# Patient Record
Sex: FEMALE | Race: WHITE | NOT HISPANIC OR LATINO | Employment: FULL TIME | URBAN - METROPOLITAN AREA
[De-identification: names, ages, dates, MRNs, and addresses within clinical notes are randomized per-mention and may not be internally consistent; named-entity substitution may affect disease eponyms.]

---

## 2017-06-24 ASSESSMENT — MIFFLIN-ST. JEOR: SCORE: 1432.76

## 2017-06-25 ENCOUNTER — SURGERY - HEALTHEAST (OUTPATIENT)
Dept: SURGERY | Facility: HOSPITAL | Age: 52
End: 2017-06-25

## 2017-06-25 ENCOUNTER — ANESTHESIA - HEALTHEAST (OUTPATIENT)
Dept: SURGERY | Facility: HOSPITAL | Age: 52
End: 2017-06-25

## 2017-06-25 ASSESSMENT — MIFFLIN-ST. JEOR
SCORE: 1433.67
SCORE: 1432.76

## 2017-06-26 ENCOUNTER — COMMUNICATION - HEALTHEAST (OUTPATIENT)
Dept: SURGERY | Facility: CLINIC | Age: 52
End: 2017-06-26

## 2017-08-28 ASSESSMENT — MIFFLIN-ST. JEOR
SCORE: 1410.08
SCORE: 1283.08

## 2017-08-29 ASSESSMENT — MIFFLIN-ST. JEOR: SCORE: 1406.91

## 2017-08-30 ENCOUNTER — ANESTHESIA - HEALTHEAST (OUTPATIENT)
Dept: SURGERY | Facility: HOSPITAL | Age: 52
End: 2017-08-30

## 2017-08-30 ENCOUNTER — SURGERY - HEALTHEAST (OUTPATIENT)
Dept: SURGERY | Facility: HOSPITAL | Age: 52
End: 2017-08-30

## 2017-09-20 ENCOUNTER — HOSPITAL ENCOUNTER (OUTPATIENT)
Dept: MAMMOGRAPHY | Facility: HOSPITAL | Age: 52
Discharge: HOME | End: 2017-09-20

## 2017-09-20 DIAGNOSIS — Z12.31 VISIT FOR SCREENING MAMMOGRAM: ICD-10-CM

## 2019-05-22 ENCOUNTER — HOSPITAL ENCOUNTER (OUTPATIENT)
Dept: MAMMOGRAPHY | Facility: CLINIC | Age: 54
Discharge: HOME | End: 2019-05-22

## 2019-05-22 DIAGNOSIS — Z12.31 VISIT FOR SCREENING MAMMOGRAM: ICD-10-CM

## 2020-07-15 ENCOUNTER — HOSPITAL ENCOUNTER (OUTPATIENT)
Dept: MAMMOGRAPHY | Facility: CLINIC | Age: 55
Discharge: HOME | End: 2020-07-15

## 2020-07-15 DIAGNOSIS — Z12.31 VISIT FOR SCREENING MAMMOGRAM: ICD-10-CM

## 2021-05-25 ENCOUNTER — RECORDS - HEALTHEAST (OUTPATIENT)
Dept: ADMINISTRATIVE | Facility: CLINIC | Age: 56
End: 2021-05-25

## 2021-05-26 ENCOUNTER — RECORDS - HEALTHEAST (OUTPATIENT)
Dept: ADMINISTRATIVE | Facility: CLINIC | Age: 56
End: 2021-05-26

## 2021-05-30 ENCOUNTER — RECORDS - HEALTHEAST (OUTPATIENT)
Dept: ADMINISTRATIVE | Facility: CLINIC | Age: 56
End: 2021-05-30

## 2021-05-31 VITALS — WEIGHT: 188.2 LBS | BODY MASS INDEX: 32.13 KG/M2 | HEIGHT: 64 IN

## 2021-05-31 VITALS — WEIGHT: 182.3 LBS | BODY MASS INDEX: 31.12 KG/M2 | HEIGHT: 64 IN

## 2021-06-02 ENCOUNTER — RECORDS - HEALTHEAST (OUTPATIENT)
Dept: ADMINISTRATIVE | Facility: CLINIC | Age: 56
End: 2021-06-02

## 2021-06-11 NOTE — ANESTHESIA PREPROCEDURE EVALUATION
Anesthesia Evaluation      Patient summary reviewed   History of anesthetic complications (There is a family history of pseudocholinesterase deficiency *NO SUCCINYLCHOLINE*)     Airway   Mallampati: II  Neck ROM: full   Pulmonary - normal exam                          Cardiovascular   Exercise tolerance: > or = 4 METS  ECG reviewed (6/17: Sinus megan)  Rhythm: regular  Rate: normal,         Neuro/Psych      Endo/Other - negative ROS   (+) obesity (BMI 32),      GI/Hepatic/Renal - negative ROS      Other findings: Sleepy from pain med administration      Dental      Comment: She does have a broken tooth on the right upper jaw, discussed the risk of dental trauma                       Anesthesia Plan  Planned anesthetic: general endotracheal  Rocuronium only please    ASA 2     Anesthetic plan and risks discussed with: patient  Anesthesia plan special considerations: antiemetics,   Post-op plan: routine recovery

## 2021-06-11 NOTE — ANESTHESIA CARE TRANSFER NOTE
Last vitals:   Vitals:    06/25/17 1617   BP: 109/62   Pulse: 80   Resp: 14   Temp: 37.4  C (99.4  F)   SpO2: 100%     Patient's level of consciousness is drowsy  Spontaneous respirations: yes  Maintains airway independently: yes  Dentition unchanged: yes  Oropharynx: oropharynx clear of all foreign objects    QCDR Measures:  ASA# 20 - Surgical Safety Checklist: ASA20A - Safety Checks Done  PQRS# 430 - Adult PONV Prevention: 4558F - Pt received => 2 anti-emetic agents (different classes) preop & intraop  ASA# 8 - Peds PONV Prevention: NA - Not pediatric patient, not GA or 2 or more risk factors NOT present  PQRS# 424 - Teresa-op Temp Management: 4559F - At least one body temp DOCUMENTED => 35.5C or 95.9F within required timeframe  PQRS# 426 - PACU Transfer Protocol: - Transfer of care checklist used  ASA# 14 - Acute Post-op Pain: ASA14B - Patient did NOT experience pain >= 7 out of 10

## 2021-06-12 NOTE — ANESTHESIA PREPROCEDURE EVALUATION
"Anesthesia Evaluation      Patient summary reviewed   History of anesthetic complications ( Pseudocholinesterase deificiency in Mom, Sister's x 2 and Aunt)     Airway   Mallampati: II  Neck ROM: full   Pulmonary - normal exam   (+) a smoker (Uses E-cigs)                         Cardiovascular - negative ROS and normal exam   Neuro/Psych - negative ROS     Endo/Other    (+) obesity,      Comments: Left adrenal mass    GI/Hepatic/Renal - negative ROS           Dental    (+) chipped and caps    Comment: Broken tooth                       Anesthesia Plan  Planned anesthetic: general endotracheal  Decadron 10 mg IV  Toradol if \"ok\" with Dr. Pittman  Scopolamine patch  NO Sux  ASA 2   Induction: intravenous   Anesthetic plan and risks discussed with: patient  Anesthesia plan special considerations: antiemetics,   Post-op plan: routine recovery          "

## 2021-06-12 NOTE — ANESTHESIA POSTPROCEDURE EVALUATION
Patient: Kierra Alvarez  CHOLECYSTECTOMY, LAPAROSCOPIC LYSIS OF ADHESIONS, WITH CHOLANGIOGRAMS  Anesthesia type: general    Patient location: PACU  Last vitals:   Vitals:    08/30/17 1410   BP: 123/69   Pulse: (!) 55   Resp: 17   Temp:    SpO2: 100%     Post vital signs: stable  Level of consciousness: awake and responds to simple questions  Post-anesthesia pain: pain controlled  Post-anesthesia nausea and vomiting: no  Pulmonary: unassisted  Cardiovascular: stable  Hydration: adequate  Anesthetic events: no    QCDR Measures:  ASA# 11 - Teresa-op Cardiac Arrest: ASA11B - Patient did NOT experience unanticipated cardiac arrest  ASA# 12 - Teresa-op Mortality Rate: ASA12B - Patient did NOT die  ASA# 13 - PACU Re-Intubation Rate: ASA13B - Patient did NOT require a new airway mgmt  ASA# 10 - Composite Anes Safety: ASA10A - No serious adverse event  ASA# 38 - New Corneal Injury: ASA38A - No new exposure keratitis or corneal abrasion in PACU    Additional Notes:

## 2021-06-12 NOTE — ANESTHESIA CARE TRANSFER NOTE
Last vitals:   Vitals:    08/30/17 1403   BP: 127/72   Pulse: 64   Resp: 12   Temp: 36.2  C (97.2  F)   SpO2: 100%     Patient's level of consciousness is drowsy  Spontaneous respirations: yes  Maintains airway independently: yes  Dentition unchanged: yes  Oropharynx: oropharynx clear of all foreign objects    QCDR Measures:  ASA# 20 - Surgical Safety Checklist: WHO surgical safety checklist completed prior to induction  PQRS# 430 - Adult PONV Prevention: 4558F - Pt received => 2 anti-emetic agents (different classes) preop & intraop  ASA# 8 - Peds PONV Prevention: NA - Not pediatric patient, not GA or 2 or more risk factors NOT present  PQRS# 424 - Teresa-op Temp Management: 4559F - At least one body temp DOCUMENTED => 35.5C or 95.9F within required timeframe  PQRS# 426 - PACU Transfer Protocol: - Transfer of care checklist used  ASA# 14 - Acute Post-op Pain: ASA14B - Patient did NOT experience pain >= 7 out of 10

## 2021-06-16 PROBLEM — E80.6 HYPERBILIRUBINEMIA: Status: ACTIVE | Noted: 2017-08-28

## 2021-06-16 PROBLEM — R79.89 ELEVATED LFTS: Status: ACTIVE | Noted: 2017-08-28

## 2021-06-16 PROBLEM — K37 APPENDICITIS: Status: ACTIVE | Noted: 2017-06-25

## 2021-06-16 PROBLEM — K80.10 CHOLECYSTITIS WITH CHOLELITHIASIS: Status: ACTIVE | Noted: 2017-08-28

## 2021-06-26 ENCOUNTER — HEALTH MAINTENANCE LETTER (OUTPATIENT)
Age: 56
End: 2021-06-26

## 2021-07-13 ENCOUNTER — RECORDS - HEALTHEAST (OUTPATIENT)
Dept: ADMINISTRATIVE | Facility: CLINIC | Age: 56
End: 2021-07-13

## 2021-10-16 ENCOUNTER — HEALTH MAINTENANCE LETTER (OUTPATIENT)
Age: 56
End: 2021-10-16

## 2022-03-03 ENCOUNTER — LAB REQUISITION (OUTPATIENT)
Dept: LAB | Facility: CLINIC | Age: 57
End: 2022-03-03

## 2022-03-03 DIAGNOSIS — R79.89 OTHER SPECIFIED ABNORMAL FINDINGS OF BLOOD CHEMISTRY: ICD-10-CM

## 2022-03-03 DIAGNOSIS — Z13.220 ENCOUNTER FOR SCREENING FOR LIPOID DISORDERS: ICD-10-CM

## 2022-03-03 LAB
ALBUMIN SERPL-MCNC: 4.2 G/DL (ref 3.5–5)
ALP SERPL-CCNC: 103 U/L (ref 45–120)
ALT SERPL W P-5'-P-CCNC: 65 U/L (ref 0–45)
ANION GAP SERPL CALCULATED.3IONS-SCNC: 13 MMOL/L (ref 5–18)
AST SERPL W P-5'-P-CCNC: 35 U/L (ref 0–40)
BILIRUB SERPL-MCNC: 0.8 MG/DL (ref 0–1)
BUN SERPL-MCNC: 9 MG/DL (ref 8–22)
CALCIUM SERPL-MCNC: 9.7 MG/DL (ref 8.5–10.5)
CHLORIDE BLD-SCNC: 104 MMOL/L (ref 98–107)
CHOLEST SERPL-MCNC: 194 MG/DL
CO2 SERPL-SCNC: 26 MMOL/L (ref 22–31)
CREAT SERPL-MCNC: 0.79 MG/DL (ref 0.6–1.1)
FASTING STATUS PATIENT QL REPORTED: ABNORMAL
GFR SERPL CREATININE-BSD FRML MDRD: 88 ML/MIN/1.73M2
GLUCOSE BLD-MCNC: 88 MG/DL (ref 70–125)
HDLC SERPL-MCNC: 47 MG/DL
LDLC SERPL CALC-MCNC: 121 MG/DL
POTASSIUM BLD-SCNC: 3.8 MMOL/L (ref 3.5–5)
PROT SERPL-MCNC: 7.4 G/DL (ref 6–8)
SODIUM SERPL-SCNC: 143 MMOL/L (ref 136–145)
TRIGL SERPL-MCNC: 132 MG/DL

## 2022-03-03 PROCEDURE — 82040 ASSAY OF SERUM ALBUMIN: CPT | Performed by: STUDENT IN AN ORGANIZED HEALTH CARE EDUCATION/TRAINING PROGRAM

## 2022-03-03 PROCEDURE — 80053 COMPREHEN METABOLIC PANEL: CPT | Performed by: STUDENT IN AN ORGANIZED HEALTH CARE EDUCATION/TRAINING PROGRAM

## 2022-03-03 PROCEDURE — 80061 LIPID PANEL: CPT | Performed by: STUDENT IN AN ORGANIZED HEALTH CARE EDUCATION/TRAINING PROGRAM

## 2022-07-23 ENCOUNTER — HEALTH MAINTENANCE LETTER (OUTPATIENT)
Age: 57
End: 2022-07-23

## 2022-10-01 ENCOUNTER — HEALTH MAINTENANCE LETTER (OUTPATIENT)
Age: 57
End: 2022-10-01

## 2023-08-06 ENCOUNTER — HEALTH MAINTENANCE LETTER (OUTPATIENT)
Age: 58
End: 2023-08-06

## 2023-11-02 ENCOUNTER — LAB REQUISITION (OUTPATIENT)
Dept: LAB | Facility: CLINIC | Age: 58
End: 2023-11-02

## 2023-11-02 ENCOUNTER — TRANSFERRED RECORDS (OUTPATIENT)
Dept: HEALTH INFORMATION MANAGEMENT | Facility: CLINIC | Age: 58
End: 2023-11-02
Payer: COMMERCIAL

## 2023-11-02 DIAGNOSIS — Z13.220 ENCOUNTER FOR SCREENING FOR LIPOID DISORDERS: ICD-10-CM

## 2023-11-02 DIAGNOSIS — Z13.1 ENCOUNTER FOR SCREENING FOR DIABETES MELLITUS: ICD-10-CM

## 2023-11-02 PROCEDURE — 80053 COMPREHEN METABOLIC PANEL: CPT | Performed by: NURSE PRACTITIONER

## 2023-11-02 PROCEDURE — 80061 LIPID PANEL: CPT | Performed by: NURSE PRACTITIONER

## 2023-11-03 LAB
ALBUMIN SERPL BCG-MCNC: 4.5 G/DL (ref 3.5–5.2)
ALP SERPL-CCNC: 102 U/L (ref 35–104)
ALT SERPL W P-5'-P-CCNC: 96 U/L (ref 0–50)
ANION GAP SERPL CALCULATED.3IONS-SCNC: 13 MMOL/L (ref 7–15)
AST SERPL W P-5'-P-CCNC: 50 U/L (ref 0–45)
BILIRUB SERPL-MCNC: 0.5 MG/DL
BUN SERPL-MCNC: 9.2 MG/DL (ref 6–20)
CALCIUM SERPL-MCNC: 9.4 MG/DL (ref 8.6–10)
CHLORIDE SERPL-SCNC: 103 MMOL/L (ref 98–107)
CHOLEST SERPL-MCNC: 198 MG/DL
CREAT SERPL-MCNC: 0.84 MG/DL (ref 0.51–0.95)
DEPRECATED HCO3 PLAS-SCNC: 25 MMOL/L (ref 22–29)
EGFRCR SERPLBLD CKD-EPI 2021: 81 ML/MIN/1.73M2
GLUCOSE SERPL-MCNC: 92 MG/DL (ref 70–99)
HDLC SERPL-MCNC: 48 MG/DL
LDLC SERPL CALC-MCNC: 127 MG/DL
NONHDLC SERPL-MCNC: 150 MG/DL
POTASSIUM SERPL-SCNC: 3.8 MMOL/L (ref 3.4–5.3)
PROT SERPL-MCNC: 7.6 G/DL (ref 6.4–8.3)
SODIUM SERPL-SCNC: 141 MMOL/L (ref 135–145)
TRIGL SERPL-MCNC: 116 MG/DL

## 2023-11-16 ENCOUNTER — TRANSCRIBE ORDERS (OUTPATIENT)
Dept: OTHER | Age: 58
End: 2023-11-16

## 2023-11-16 ENCOUNTER — MEDICAL CORRESPONDENCE (OUTPATIENT)
Dept: HEALTH INFORMATION MANAGEMENT | Facility: CLINIC | Age: 58
End: 2023-11-16
Payer: COMMERCIAL

## 2023-11-16 ENCOUNTER — PATIENT OUTREACH (OUTPATIENT)
Dept: ONCOLOGY | Facility: CLINIC | Age: 58
End: 2023-11-16
Payer: COMMERCIAL

## 2023-11-16 DIAGNOSIS — Z80.0 FAMILY HISTORY OF LYNCH SYNDROME: Primary | ICD-10-CM

## 2023-11-16 NOTE — PROGRESS NOTES
Writer received referral to Cancer Risk Management/Genetic Counseling.    Referred for:     family Hx of Rodríguez Syndrome        Referral reviewed for appropriate plan, and sent to New Patient Scheduling for completion.    Annelise Mas, RN, BSN  Oncology New Patient Nurse Navigator   [facility]  [phone]

## 2024-01-04 ENCOUNTER — TRANSFERRED RECORDS (OUTPATIENT)
Dept: HEALTH INFORMATION MANAGEMENT | Facility: CLINIC | Age: 59
End: 2024-01-04
Payer: COMMERCIAL

## 2024-01-04 ENCOUNTER — MEDICAL CORRESPONDENCE (OUTPATIENT)
Dept: HEALTH INFORMATION MANAGEMENT | Facility: CLINIC | Age: 59
End: 2024-01-04
Payer: COMMERCIAL

## 2024-01-04 NOTE — TELEPHONE ENCOUNTER
Action January 4, 2024 3:31 PM ABT   Action Taken Records from Ivan received and sent to HIM for upload     RECORDS STATUS - ALL OTHER DIAGNOSIS      [facility]   DATE RECEIVED:    NOTES STATUS DETAILS   OFFICE NOTE from referring provider External: Ivan 11/02/23: [facility]   DISCHARGE SUMMARY from hospital [facility] 08/28/17, 06/24/17: [facility]   DISCHARGE REPORT from the ER [facility] 06/19/22: [facility]   OPERATIVE REPORT [facility] 06/25/17: Appendectomy, laparoscopic   MEDICATION LIST External: Ivan    LABS     PATHOLOGY REPORTS Reports in EPIC 08/30/17: [id number]  06/25/17: [id number]   ANYTHING RELATED TO DIAGNOSIS External: Ivan Most recent 11/02/23   IMAGING (NEED IMAGES & REPORT)     CT SCANS PACS 06/25/17, 06/03/16: CT AP   ULTRASOUND PACS 08/28/17: US Abd

## 2024-04-04 ENCOUNTER — VIRTUAL VISIT (OUTPATIENT)
Dept: ONCOLOGY | Facility: CLINIC | Age: 59
End: 2024-04-04
Attending: NURSE PRACTITIONER
Payer: COMMERCIAL

## 2024-04-04 ENCOUNTER — PRE VISIT (OUTPATIENT)
Dept: ONCOLOGY | Facility: CLINIC | Age: 59
End: 2024-04-04
Payer: COMMERCIAL

## 2024-04-04 DIAGNOSIS — Z83.719 FAMILY HISTORY OF COLONIC POLYPS: ICD-10-CM

## 2024-04-04 DIAGNOSIS — Z80.0 FAMILY HISTORY OF COLON CANCER: ICD-10-CM

## 2024-04-04 DIAGNOSIS — Z80.49 FAMILY HISTORY OF MALIGNANT NEOPLASM OF UTERUS: ICD-10-CM

## 2024-04-04 DIAGNOSIS — Z84.81 FAMILY HISTORY OF CARRIER OF GENETIC DISEASE: ICD-10-CM

## 2024-04-04 DIAGNOSIS — Z80.3 FAMILY HISTORY OF MALIGNANT NEOPLASM OF BREAST: ICD-10-CM

## 2024-04-04 DIAGNOSIS — Z80.0 FAMILY HISTORY OF LYNCH SYNDROME: Primary | ICD-10-CM

## 2024-04-04 DIAGNOSIS — Z80.0 FAMILY HISTORY OF STOMACH CANCER: ICD-10-CM

## 2024-04-04 DIAGNOSIS — Z80.42 FAMILY HISTORY OF MALIGNANT NEOPLASM OF PROSTATE: ICD-10-CM

## 2024-04-04 PROCEDURE — 96040 HC GENETIC COUNSELING, EACH 30 MINUTES: CPT | Mod: GT,95 | Performed by: GENETIC COUNSELOR, MS

## 2024-04-04 NOTE — LETTER
4/4/2024         RE: Kierra Alvarez  [address] [place] [address]        Dear Colleague,    Thank you for referring your patient, Kierra Alvarez, to the [facility]. Please see a copy of my visit note below.    Virtual Visit Details    Type of service:  Video Visit   Video Start Time: 11:04am  Video End Time: 12:00pm  Originating Location (pt. Location): Home  Distant Location (provider location):  Off-site  Platform used for Video Visit: Yoostay    4/4/2024    Referring Provider:    Celso Moon MD     Presenting Information:   I met with Kierra Alvarez today via video visit for genetic counseling to discuss the known MSH6 mutation in her family. Specifically, the familial MSH6 mutation is called [id number]. She is here today to review this history, cancer screening recommendations, and available genetic testing options.    Personal History:  Kierra is a 59 year old female. She does not have any personal history of cancer.      She had her first menstrual period at age 13, her first child at age 25, and reports that she is post-menopausal.  Kierra is s/p hysterectomy in 2002 for prolapse; ovarian remain in place. She reports past history of oral contraceptive use for about 18 years and that she has never been on hormone replacement therapy.      Her most recent mammogram in July 2020 was normal and her breast density was categorized as scattered fibroglandular densities. Her first and most recent colonoscopy in 2017 was normal and follow-up was recommended in 10 years.  She does not regularly do any other cancer screening at this time.  Kierra reported past tobacco use (quit in 2012), and 0-2 drinks per week for alcohol use.    Family History: (Please see scanned pedigree for detailed family history information)  Son, age 33, has a history of 2-5 colon polyps.   Daughter, age 24, has a history of 2-5 colon polyps.   Three of her brother and one sister have had a handful of  colon polyps (all less then 10)   Kierra reports that all 7 of her maternal uncles have had prostate cancers.   One of these maternal uncles also had a colon cancer diagnosed at age 48.   One of these maternal uncles also had a colon cancer diagnosed at age 52.   One of these maternal uncles underwent genetic testing and tested positive for the familial MSH6 mutation. This uncle has a daughter, age 48, who was diagnosed with a colon cancer at age 47 and hs a history of many (100+) colon polyps. Kierra reports that this cousin has also tested positive for the familial MSH6 mutation.   Maternal aunt had a history of breast cancer diagnosed at age 65. This aunt has a daughter with a history of endometrial cancer diagnosed in her 50's. She has tested positive for the familial MSH6 mutation (Kierra did provide this cousin's genetic testing report for review). This aunt also has a daughter who passed from a brain cancer in her 50's.   Maternal grandmother with a history of breast cancer diagnosed at age 78.   Maternal grandfather passed at age 78 from stomach cancer diagnosed at age 69.   Father with a history of non-metastatic prostate cancer diagnosed at age 59. He also has a history of a handful of colon polyps.   Paternal first-cousin passed at age 64 from a bile duct cancer.   There is no known Ashkenazi Mormonism ancestry on either side of her family. There is no reported consanguinity.      Discussion:  Kierra has a family history of gastrointestinal and gynecologic cancer with an identified MSH6 gene mutation.  Specifically, the mutation identified in her two maternal cousins and her maternal uncle is called c.1030C>T.  We discussed that this gene is associated with Rodríguez syndrome and an increased risk for gastrointestinal, gynecologic, and other cancers.  We discussed the impact of this testing on Kierra and her family in detail.    We discussed the natural history and genetics of Rodríguez syndrome caused by mutations  in the MSH6 gene. A detailed handout regarding this cancer syndrome and the information we discussed was provided to Kierra at the end of our appointment today and can be found in the after visit summary.  Topics included: inheritance pattern, cancer risks, cancer screening recommendations, and also risks, benefits and limitations of testing.  Rodríguez syndrome can be caused by a mutation in one of five genes:  MLH1, MSH2, MSH6, PMS2, and EPCAM.  Rodríguez syndrome may present with polyps, but typically a small number.  The highest cancer risks associated with Rodríguez syndrome include colon cancer, endometrial/uterine cancer, gastric cancer, and ovarian cancer. Specifically for the MSH6 genes, colon cancer risk is as high as 44%, endometrial cancer risk is as high as 49%, ovaries cancer risk is as high as 13%. Elevated risk also exist for gastric, bladder, and small bowel cancers. We reviewed recommended screening for those with MSH6-related Rodríguez syndrome.   We reviewed that mutations in the MSH6 gene are inherited in an autosomal dominant pattern.  This means that Kierra has a 25% chance of inheriting the same mutation which was identified in her maternal uncle. Mutations in this gene to not skip generations. We also discussed that it is always most informative to test the people in the family who have had the cancers concerning for a hereditary component or the people most closely related to those individuals. For Kierra's family, this would be her mother. Unfortunately, Kierra's mother is unable to complete genetic testing at this time. We reviewed the implications of interpreting a negative genetic test results in an unaffected person. Kierra expressed understanding and the desire to move forward with genetic testing for herself.  We also discussed her additional unexplained family history of breast and prostate cancer. We reviewed that the most common cause of hereditary breast cancer is Hereditary Breast and Ovarian  Cancer (HBOC) syndrome, which is caused by mutations in the genes BRCA1 and BRCA2. Women with a mutation in either of these genes are at increased risk for breast and ovarian cancer. There is also an increased risk for a second primary breast cancer for women. Men with a mutation in either BRCA1 or BRCA2 are at increased risk for male breast and prostate cancer. Both women and men may also be at increased risk for pancreatic cancer and melanoma.   Based on her family history, Kierra meets current National Comprehensive Cancer Network (NCCN) criteria for genetic testing of Rodríguez syndrome as well as high-penetrance breast cancer susceptibility genes including BRCA1, BRCA2, CDH1, PALB2, PTEN, STK11, and TP53.    We discussed that there are additional genes that could cause increased risk for hereditary cancer. As many of these genes present with overlapping features in a family and accurate cancer risk cannot always be established based upon the pedigree analysis alone, it would be reasonable for Kierra to consider panel genetic testing to analyze multiple genes at once.  We reviewed genetic testing options given Kierra's family history including targeted and expanded options. After our discussion, Kierra opted to proceed with genetic testing via the MSH6 analysis and BRCA1/2 analysis with automatic reflex to the BRCANext-Expanded panel through [facility].   Genetic testing is available for 21 genes associated with hereditary gynecologic, breast, and related cancers: (ESTEFANY, BARD1, BRCA1, BRCA2, BRIP1, CDH1, CHEK2, DICER1, EPCAM, MLH1, MSH2, MSH6, NF1, PALB2, PMS2, PTEN, RAD51C, RAD51D, SMARCA4, STK11, TP53).  We discussed that some of the genes in the BRCANext-Expanded panel are associated with specific hereditary cancer syndromes and published management guidelines: Hereditary Breast and Ovarian Cancer syndrome (BRCA1, BRCA2), Rodríguez syndrome (MLH1, MSH2, MSH6, PMS2, EPCAM), Hereditary Diffuse Gastric Cancer (CDH1),  Cowden syndrome (PTEN), Li Fraumeni syndrome (TP53), Peutz-Jeghers syndrome (STK11), and Neurofibromatosis type 1 (NF1).    Risk-reducing salpingo-oophorectomy can be considered in women with mutations in BRIP1, RAD51C, or RAD51D. Breast and/or other cancer risk management guidelines are available for ESTEFANY, CHEK2, PALB2, and NF1.  The remaining genes (BARD1, DICER1, and SMARCA4) are associated with increased cancer risk and may allow us to make medical recommendations when mutations are identified.    Medical Management: For Kierra, we reviewed that the information from genetic testing may determine:  additional cancer screening for which Kierra may qualify (i.e. mammogram and breast MRI, more frequent colonoscopies, more frequent dermatologic exams, etc.),  options for risk reducing surgeries Kierra could consider (i.e. bilateral mastectomy, surgery to remove her ovaries, etc.),    and targeted chemotherapies for Kierra if she were to develop certain cancers in the future (i.e. immunotherapy for individuals with Rodríguez syndrome, PARP inhibitors, etc.).   Verbal consent obtained over video today with no witness required. A copy of the lab's consent form will be sent to Kierra via [employer] for review.   These recommendations will be discussed in detail once genetic testing is completed.   Kierra opted to have a salivia kit sent to her home to complete the genetic testing. A kit was ordered from [employer] and a saliva collection kit will be sent to Kierra's home address. I confirmed with Kierra that the address we have on file is her current and correct address. We discussed some of the limitations of completing genetic testing via saliva and Kierra was instructed to follow the instruction provided in the kit to ensure the best possibility of success for saliva genetic testing.       Plan:  1) Today Kierra elected to proceed with MSH6 and BRCA1/2 analysis with automatic reflex to BRCANext-Expanded panel genetic  testing (21 genes) through [facility]  2) This information should be available in approximately 3-4 weeks from the time the lab receives her sample.  3) Kierra will be contacted by our scheduling department to set up a result disclosure appointment.     Lisbeth House MS, [facility]  Licensed, Certified Genetic Counselor  [facility]  [email or web address]        Again, thank you for allowing me to participate in the care of your patient.        Sincerely,        Lisbeth Jacobson, GC

## 2024-04-04 NOTE — PROGRESS NOTES
Virtual Visit Details    Type of service:  Video Visit   Video Start Time: 11:04am  Video End Time: 12:00pm  Originating Location (pt. Location): Home  Distant Location (provider location):  Off-site  Platform used for Video Visit: [employer]    4/4/2024    Referring Provider:    Celso Moon MD     Presenting Information:   I met with Kierra Alvarez today via video visit for genetic counseling to discuss the known MSH6 mutation in her family. Specifically, the familial MSH6 mutation is called [id number]. She is here today to review this history, cancer screening recommendations, and available genetic testing options.    Personal History:  Kierra is a 59 year old female. She does not have any personal history of cancer.      She had her first menstrual period at age 13, her first child at age 25, and reports that she is post-menopausal.  Kierra is s/p hysterectomy in 2002 for prolapse; ovarian remain in place. She reports past history of oral contraceptive use for about 18 years and that she has never been on hormone replacement therapy.      Her most recent mammogram in July 2020 was normal and her breast density was categorized as scattered fibroglandular densities. Her first and most recent colonoscopy in 2017 was normal and follow-up was recommended in 10 years.  She does not regularly do any other cancer screening at this time.  Kierra reported past tobacco use (quit in 2012), and 0-2 drinks per week for alcohol use.    Family History: (Please see scanned pedigree for detailed family history information)  Son, age 33, has a history of 2-5 colon polyps.   Daughter, age 24, has a history of 2-5 colon polyps.   Three of her brother and one sister have had a handful of colon polyps (all less then 10)   Kierra reports that all 7 of her maternal uncles have had prostate cancers.   One of these maternal uncles also had a colon cancer diagnosed at age 48.   One of these maternal uncles also had a colon cancer diagnosed at  age 52.   One of these maternal uncles underwent genetic testing and tested positive for the familial MSH6 mutation. This uncle has a daughter, age 48, who was diagnosed with a colon cancer at age 47 and hs a history of many (100+) colon polyps. Kierra reports that this cousin has also tested positive for the familial MSH6 mutation.   Maternal aunt had a history of breast cancer diagnosed at age 65. This aunt has a daughter with a history of endometrial cancer diagnosed in her 50's. She has tested positive for the familial MSH6 mutation (Kierra did provide this cousin's genetic testing report for review). This aunt also has a daughter who passed from a brain cancer in her 50's.   Maternal grandmother with a history of breast cancer diagnosed at age 78.   Maternal grandfather passed at age 78 from stomach cancer diagnosed at age 69.   Father with a history of non-metastatic prostate cancer diagnosed at age 59. He also has a history of a handful of colon polyps.   Paternal first-cousin passed at age 64 from a bile duct cancer.   There is no known Ashkenazi Adventism ancestry on either side of her family. There is no reported consanguinity.      Discussion:  Kierra has a family history of gastrointestinal and gynecologic cancer with an identified MSH6 gene mutation.  Specifically, the mutation identified in her two maternal cousins and her maternal uncle is called [id number].  We discussed that this gene is associated with Rodríguez syndrome and an increased risk for gastrointestinal, gynecologic, and other cancers.  We discussed the impact of this testing on Kierra and her family in detail.    We discussed the natural history and genetics of Rodríguez syndrome caused by mutations in the MSH6 gene. A detailed handout regarding this cancer syndrome and the information we discussed was provided to Kierra at the end of our appointment today and can be found in the after visit summary.  Topics included: inheritance pattern, cancer  risks, cancer screening recommendations, and also risks, benefits and limitations of testing.  Rodríguez syndrome can be caused by a mutation in one of five genes:  MLH1, MSH2, MSH6, PMS2, and EPCAM.  Rodríguez syndrome may present with polyps, but typically a small number.  The highest cancer risks associated with Rodríguez syndrome include colon cancer, endometrial/uterine cancer, gastric cancer, and ovarian cancer. Specifically for the MSH6 genes, colon cancer risk is as high as 44%, endometrial cancer risk is as high as 49%, ovaries cancer risk is as high as 13%. Elevated risk also exist for gastric, bladder, and small bowel cancers. We reviewed recommended screening for those with MSH6-related Rodríguez syndrome.   We reviewed that mutations in the MSH6 gene are inherited in an autosomal dominant pattern.  This means that Kierra has a 25% chance of inheriting the same mutation which was identified in her maternal uncle. Mutations in this gene to not skip generations. We also discussed that it is always most informative to test the people in the family who have had the cancers concerning for a hereditary component or the people most closely related to those individuals. For Kierra's family, this would be her mother. Unfortunately, Kierra's mother is unable to complete genetic testing at this time. We reviewed the implications of interpreting a negative genetic test results in an unaffected person. Kierra expressed understanding and the desire to move forward with genetic testing for herself.  We also discussed her additional unexplained family history of breast and prostate cancer. We reviewed that the most common cause of hereditary breast cancer is Hereditary Breast and Ovarian Cancer (HBOC) syndrome, which is caused by mutations in the genes BRCA1 and BRCA2. Women with a mutation in either of these genes are at increased risk for breast and ovarian cancer. There is also an increased risk for a second primary breast cancer  for women. Men with a mutation in either BRCA1 or BRCA2 are at increased risk for male breast and prostate cancer. Both women and men may also be at increased risk for pancreatic cancer and melanoma.   Based on her family history, Kierra meets current National Comprehensive Cancer Network (NCCN) criteria for genetic testing of Rodríguez syndrome as well as high-penetrance breast cancer susceptibility genes including BRCA1, BRCA2, CDH1, PALB2, PTEN, STK11, and TP53.    We discussed that there are additional genes that could cause increased risk for hereditary cancer. As many of these genes present with overlapping features in a family and accurate cancer risk cannot always be established based upon the pedigree analysis alone, it would be reasonable for Kierra to consider panel genetic testing to analyze multiple genes at once.  We reviewed genetic testing options given Kierra's family history including targeted and expanded options. After our discussion, Kierra opted to proceed with genetic testing via the MSH6 analysis and BRCA1/2 analysis with automatic reflex to the BRCANext-Expanded panel through [facility].   Genetic testing is available for 21 genes associated with hereditary gynecologic, breast, and related cancers: (ESTEFANY, BARD1, BRCA1, BRCA2, BRIP1, CDH1, CHEK2, DICER1, EPCAM, MLH1, MSH2, MSH6, NF1, PALB2, PMS2, PTEN, RAD51C, RAD51D, SMARCA4, STK11, TP53).  We discussed that some of the genes in the BRCANext-Expanded panel are associated with specific hereditary cancer syndromes and published management guidelines: Hereditary Breast and Ovarian Cancer syndrome (BRCA1, BRCA2), Rodríguez syndrome (MLH1, MSH2, MSH6, PMS2, EPCAM), Hereditary Diffuse Gastric Cancer (CDH1), Cowden syndrome (PTEN), Li Fraumeni syndrome (TP53), Peutz-Jeghers syndrome (STK11), and Neurofibromatosis type 1 (NF1).    Risk-reducing salpingo-oophorectomy can be considered in women with mutations in BRIP1, RAD51C, or RAD51D. Breast and/or  other cancer risk management guidelines are available for ESTEFANY, CHEK2, PALB2, and NF1.  The remaining genes (BARD1, DICER1, and SMARCA4) are associated with increased cancer risk and may allow us to make medical recommendations when mutations are identified.    Medical Management: For Kierra, we reviewed that the information from genetic testing may determine:  additional cancer screening for which Kierra may qualify (i.e. mammogram and breast MRI, more frequent colonoscopies, more frequent dermatologic exams, etc.),  options for risk reducing surgeries Kierra could consider (i.e. bilateral mastectomy, surgery to remove her ovaries, etc.),    and targeted chemotherapies for Kierra if she were to develop certain cancers in the future (i.e. immunotherapy for individuals with Rodríguez syndrome, PARP inhibitors, etc.).   Verbal consent obtained over video today with no witness required. A copy of the lab's consent form will be sent to Kierra via [employer] for review.   These recommendations will be discussed in detail once genetic testing is completed.   Kierra opted to have a salivia kit sent to her home to complete the genetic testing. A kit was ordered from [facility] and a saliva collection kit will be sent to Kierra's home address. I confirmed with Kierra that the address we have on file is her current and correct address. We discussed some of the limitations of completing genetic testing via saliva and Kierra was instructed to follow the instruction provided in the kit to ensure the best possibility of success for saliva genetic testing.       Plan:  1) Today Kierra elected to proceed with MSH6 and BRCA1/2 analysis with automatic reflex to BRCANext-Expanded panel genetic testing (21 genes) through [facility].  2) This information should be available in approximately 3-4 weeks from the time the lab receives her sample.  3) Kierra will be contacted by our scheduling department to set up a result disclosure  appointment.     Lisbeth House MS, [facility]  Licensed, Certified Genetic Counselor  [facility]  [email or web address]

## 2024-04-04 NOTE — NURSING NOTE
Is the patient currently in the state of [place]? YES    Visit mode:VIDEO    If the visit is dropped, the patient can be reconnected by: VIDEO VISIT: Text to cell phone:   Telephone Information:   Mobile [phone]       Will anyone else be joining the visit? NO  (If patient encounters technical issues they should call [phone]    How would you like to obtain your AVS? MyChart    Are changes needed to the allergy or medication list? N/A    Are refills needed on medications prescribed by this physician? NO    Reason for visit: Consult    Brad PAULA

## 2024-04-05 NOTE — PATIENT INSTRUCTIONS
Assessing Cancer Risk  Cancer is a common diagnosis which impacts many families.  Individuals may develop cancer due to environmental factors (such as exposures and lifestyle), aging, genetic predisposition, or a combination of these factors.      Approximately 5-10% of cancer diagnoses are thought to be caused by inherited risk factors.  These families often have:  Several people with the same or related types of cancer  Cancers diagnosed at a young age (before age 50)  Individuals with more than one primary cancer  Multiple generations of the family affected with cancer    Rodríguez Syndrome Genes  Many of the genes we are born with impact our risk of cancer.  When one of these genes is not working properly due to a mistake (known as a  mutation  or  variant ), this may lead to an increased risk of developing certain types of cancer.      There are currently five genes known to cause Rodríguez Syndrome: MLH1, MSH2, MSH6, PMS2, and EPCAM.  A single mutation in one of the Rodríguez Syndrome genes increases the risk of developing several types of cancers, including colon, endometrial, ovarian, and stomach.  Other cancers that can be seen in some families include pancreatic, bladder, biliary tract, urothelial, small bowel, prostate, breast and brain cancers.  The table below lists the chance that a person with Rodríguez syndrome would develop cancer in their lifetime1.  Of note, exact risks differ between each gene.        Lifetime Cancer Risks    General Population Rodríguez Syndrome   Colon 4.2% 8.7-61%   Endometrial 3.1% 13-57%   Stomach <1% up to 16%   Ovarian 1.3% up-38%   Additional cancer risks may include renal pelvis, ureter, bladder, small bowel, pancreas, biliary tract, prostate, breast, brain, skin      Inheriting a mutation does not mean a person will develop cancer, but it does significantly increase their risk of certain cancers above the general population risk.    Medical Management  If a harmful mutation is found in  a Rodríguez syndrome gene, there may be increased cancer surveillance or risk reducing surgeries that can be offered. While cancer screening and medical management recommendations are based on genetic, personal and family history factors, the following guidelines may be recommended for some families with Rodríguez syndrome 1:  Colorectal: Colonoscopy screening may start as early as age 20-25 (or earlier based on family history), and repeated every 1-3 years.  The age to start and frequency of screening depends on the specific gene.     Endometrial: Hysterectomy (removal of the uterus) can be considered to reduce the risk of endometrial cancer. Screening via endometrial biopsy every 1-2 years (beginning at age 30-35) can be considered. In post-menopausal women, transvaginal ultrasound may also be considered.  Ovarian: Bilateral salpingo-oophorectomy (removal of both ovaries and fallopian tubes) may reduce the chance to develop ovarian cancer, and may be considered depending on the specific Rodríguez syndrome gene mutation. While there currently are no effective screening method for ovarian cancer, transvaginal ultrasound and a CA-125 blood test may be considered at the discretion of each individual's clinician.   Stomach: Upper gastrointestinal surveillance, including upper endoscopy (EGD) with extended duodenoscopy, may be performed in conjunction with colonoscopy.  This screening may start at age 30-40, and repeated every 2-4 years. This recommendation is largely dependent on family history, genetic, and other factors.  Urinary Tract: There is limited evidence to suggest a screening strategy for urothelial/urinary tract cancers. Annual urinalysis starting at age 30-35 can be considered. Consideration for screening is largely dependent upon personal and family history factors, as well as the specific Rodríguez Syndrome mutation.   Brain: Individuals at risk for brain cancer are encouraged to be aware of the signs and symptoms of  neurologic cancer, and should promptly report abnormal symptoms to their managing physicians.     Pancreas: Pancreatic cancer screening may be considered for some families.  This screening is typically done with contrast-enhanced MRI/ magnetic resonance cholangiopancreatography (MRCP) and/or endoscopic ultrasound (EUS).   Prostate: There is limited evidence to recommend early or more frequent screening for prostate cancer. However, individuals at risk for prostate cancer should follow screening as recommended in the NCCN Guidelines for Prostate Early Detection. This screening should be discussed with each individual's medical provider.  Skin: Due to the increased prevalence of both malignant and benign skin tumors in some Rodríguez syndrome genes (such as sebaceous adenocarcinomas and keratoacanthomas), skin exams may be considered every 1-2 years.     These recommendations vary depending on the specific gene identified, as cancer risks differ between each Rodríguez syndrome gene.  These recommendations should be discussed with an individuals genetic counselor and managing providers.      Inheritance   Rodríguez Syndrome mutations are inherited in an autosomal dominant pattern.  This means that if a parent has a mutation, each of their children will have a 50% chance of inheriting that same mutation.  Therefore, each child--male or female--would have a 50% chance of being at increased risk for developing cancer.    In rare situations in which both parents have a mutation in the same Rodríguez syndrome gene, their children each have a 25% risk for constitutional mismatch repair deficiency syndrome (CMMRD). CMMRD is a rare autosomal recessive disorder associated with cafe-au-lait spots and risk for childhood cancers. For individuals of childbearing age with Rodríguez syndrome mutations, genetic counseling and genetic testing may be advised for their partners.    Tumor Screening for Rodríguez Syndrome  There are two different tests that can  be done on a person s colon or endometrial tumor as an initial screen for Rodríguez Syndrome. These tests are called IHC (immunohistochemistry) and MSI (microsatellite instability). Tumors that show an absent protein on IHC or an unstable MSI result could be due to a mutation in one of the known Rodríguez Syndrome genes. The IHC results can also guide further genetic testing for Rodríguez Syndrome by indicating which gene should be tested.     Genetic Testing  For some families, genetic testing may help to explain why their cancer developed, provide tailored management options, and clarify the risk of developing cancer in the future.      Genetic testing involves a simple blood or saliva test and will look at the genetic information in select genes for variants associated with cancer risk.  This testing may include analysis of a single gene due to a known variant in the family, multiple genes most associated with the cancers in a family, or an expanded panel of genes related to many types of cancers.    Results  There are several possible genetic test results, including:   Positive--a harmful mutation (also known as a  pathogenic  or  likely pathogenic  variant) was identified in a gene associated with increased cancer risk.  These risks, as well as medical management options, depend on the specific genetic variant identified.    Negative--no variants were identified in the genes analyzed   Variant of unknown significance--a variant was identified in one or more genes, though it is currently unclear how this impacts cancer risk in the family.  Genetic testing labs are working to collect evidence about these uncertain variants and may provide updates in the future.    Genetic Information Nondiscrimination Act (BROOKE)  The Genetic Information Nondiscrimination Act of 2008 (BROOKE) is a federal law that protects individuals from health insurance or employment discrimination based on a genetic test result alone (with some exceptions,  including employers with fewer than 15 employees, and ).  However, BROOKE's protection does not cover life insurance, long term care, or disability insurances.  The National [employer] [place] is a great resource to learn more.    Questions to Think About Regarding Genetic Testing  What effect will the test result have on me and my relationship with my family members if I have an inherited gene mutation?  If I don t have a gene mutation?  Should I share my test results, and how will my family react to this news, which may also affect them?  Are my children ready to learn new information that may one day affect their own health?    Resources  Rodríguez Syndrome International [email or web address]   Rodríguez Syndrome Screening Network [email or web address].net   American Cancer Society (ACS) cancer.org   National Cancer [place] (NCI) cancer.gov     Please call us if you have any questions or concerns.   Cancer Risk Management Program (Appointments: [phone])    References  National Comprehensive Cancer Network. Clinical practice guidelines in oncology, colorectal cancer screening. Available online (registration required). 2022.

## 2024-06-14 ENCOUNTER — ANCILLARY PROCEDURE (OUTPATIENT)
Dept: MAMMOGRAPHY | Facility: CLINIC | Age: 59
End: 2024-06-14
Attending: NURSE PRACTITIONER
Payer: COMMERCIAL

## 2024-06-14 DIAGNOSIS — Z12.31 VISIT FOR SCREENING MAMMOGRAM: ICD-10-CM

## 2024-06-14 PROCEDURE — 77063 BREAST TOMOSYNTHESIS BI: CPT

## 2024-09-29 ENCOUNTER — HEALTH MAINTENANCE LETTER (OUTPATIENT)
Age: 59
End: 2024-09-29

## 2025-07-18 ENCOUNTER — ANCILLARY PROCEDURE (OUTPATIENT)
Dept: MAMMOGRAPHY | Facility: CLINIC | Age: 60
End: 2025-07-18
Attending: PHYSICIAN ASSISTANT
Payer: COMMERCIAL

## 2025-07-18 DIAGNOSIS — Z12.31 VISIT FOR SCREENING MAMMOGRAM: ICD-10-CM

## 2025-07-18 PROCEDURE — 77063 BREAST TOMOSYNTHESIS BI: CPT
